# Patient Record
Sex: MALE | Race: WHITE | NOT HISPANIC OR LATINO | Employment: FULL TIME | URBAN - METROPOLITAN AREA
[De-identification: names, ages, dates, MRNs, and addresses within clinical notes are randomized per-mention and may not be internally consistent; named-entity substitution may affect disease eponyms.]

---

## 2018-09-10 ENCOUNTER — HOSPITAL ENCOUNTER (OUTPATIENT)
Dept: RADIOLOGY | Facility: HOSPITAL | Age: 48
Discharge: HOME | End: 2018-09-10
Attending: NURSE PRACTITIONER
Payer: OTHER MISCELLANEOUS

## 2018-09-10 DIAGNOSIS — S43.006A DISLOCATED SHOULDER: Primary | ICD-10-CM

## 2018-09-10 DIAGNOSIS — S43.006A DISLOCATED SHOULDER: ICD-10-CM

## 2018-09-10 PROCEDURE — 73030 X-RAY EXAM OF SHOULDER: CPT | Mod: 26,LT,, | Performed by: RADIOLOGY

## 2018-09-10 PROCEDURE — 73030 X-RAY EXAM OF SHOULDER: CPT | Mod: TC,PO,LT

## 2024-10-10 ENCOUNTER — OFFICE VISIT (OUTPATIENT)
Dept: UROLOGY | Facility: CLINIC | Age: 54
End: 2024-10-10
Payer: COMMERCIAL

## 2024-10-10 VITALS
RESPIRATION RATE: 18 BRPM | TEMPERATURE: 98 F | SYSTOLIC BLOOD PRESSURE: 130 MMHG | HEART RATE: 90 BPM | BODY MASS INDEX: 33.21 KG/M2 | DIASTOLIC BLOOD PRESSURE: 77 MMHG | WEIGHT: 237.19 LBS | HEIGHT: 71 IN

## 2024-10-10 DIAGNOSIS — N52.8 OTHER MALE ERECTILE DYSFUNCTION: ICD-10-CM

## 2024-10-10 DIAGNOSIS — E29.1 MALE HYPOGONADISM: Primary | ICD-10-CM

## 2024-10-10 DIAGNOSIS — Z12.5 PROSTATE CANCER SCREENING: ICD-10-CM

## 2024-10-10 DIAGNOSIS — R53.83 FATIGUE, UNSPECIFIED TYPE: ICD-10-CM

## 2024-10-10 PROCEDURE — 1159F MED LIST DOCD IN RCRD: CPT | Mod: CPTII,S$GLB,, | Performed by: UROLOGY

## 2024-10-10 PROCEDURE — 3078F DIAST BP <80 MM HG: CPT | Mod: CPTII,S$GLB,, | Performed by: UROLOGY

## 2024-10-10 PROCEDURE — 3008F BODY MASS INDEX DOCD: CPT | Mod: CPTII,S$GLB,, | Performed by: UROLOGY

## 2024-10-10 PROCEDURE — 99204 OFFICE O/P NEW MOD 45 MIN: CPT | Mod: S$GLB,,, | Performed by: UROLOGY

## 2024-10-10 PROCEDURE — 99999 PR PBB SHADOW E&M-NEW PATIENT-LVL V: CPT | Mod: PBBFAC,,, | Performed by: UROLOGY

## 2024-10-10 PROCEDURE — 3075F SYST BP GE 130 - 139MM HG: CPT | Mod: CPTII,S$GLB,, | Performed by: UROLOGY

## 2024-10-10 RX ORDER — TIRZEPATIDE 10 MG/.5ML
10 INJECTION, SOLUTION SUBCUTANEOUS
COMMUNITY
Start: 2024-09-10

## 2024-10-10 RX ORDER — INSULIN GLARGINE 100 [IU]/ML
INJECTION, SOLUTION SUBCUTANEOUS DAILY
COMMUNITY
Start: 2018-10-10

## 2024-10-10 RX ORDER — ASPIRIN 81 MG/1
1 TABLET ORAL EVERY MORNING
COMMUNITY
Start: 2005-10-10

## 2024-10-10 RX ORDER — HYDROCODONE BITARTRATE AND ACETAMINOPHEN 7.5; 325 MG/1; MG/1
1 TABLET ORAL 2 TIMES DAILY PRN
COMMUNITY
Start: 2024-09-24 | End: 2024-10-24

## 2024-10-10 RX ORDER — INSULIN LISPRO 100 [IU]/ML
INJECTION, SOLUTION INTRAVENOUS; SUBCUTANEOUS
COMMUNITY
Start: 2018-10-10

## 2024-10-10 RX ORDER — ZOLPIDEM TARTRATE 10 MG/1
1 TABLET ORAL NIGHTLY PRN
COMMUNITY
Start: 2024-07-11

## 2024-10-10 RX ORDER — DULOXETIN HYDROCHLORIDE 60 MG/1
60 CAPSULE, DELAYED RELEASE ORAL DAILY
COMMUNITY

## 2024-10-10 RX ORDER — FERROUS SULFATE 324(65)MG
324 TABLET, DELAYED RELEASE (ENTERIC COATED) ORAL DAILY
COMMUNITY

## 2024-10-10 RX ORDER — EMPAGLIFLOZIN 25 MG/1
25 TABLET, FILM COATED ORAL DAILY
COMMUNITY

## 2024-10-10 RX ORDER — ATORVASTATIN CALCIUM 20 MG/1
20 TABLET, FILM COATED ORAL DAILY
COMMUNITY

## 2024-10-10 RX ORDER — DOXYCYCLINE 100 MG/1
CAPSULE ORAL 2 TIMES DAILY
COMMUNITY
Start: 2024-09-12

## 2024-10-10 RX ORDER — LOSARTAN POTASSIUM AND HYDROCHLOROTHIAZIDE 12.5; 1 MG/1; MG/1
0.5 TABLET ORAL DAILY
COMMUNITY
Start: 2024-09-03

## 2024-10-10 RX ORDER — BUPROPION HYDROCHLORIDE 300 MG/1
300 TABLET ORAL DAILY
COMMUNITY

## 2024-10-10 RX ORDER — DEXTROSE 4 G
TABLET,CHEWABLE ORAL
COMMUNITY

## 2024-10-10 RX ORDER — ZINC GLUCONATE 50 MG
1 TABLET ORAL DAILY
COMMUNITY

## 2024-10-10 RX ORDER — BLOOD-GLUCOSE SENSOR
EACH MISCELLANEOUS
COMMUNITY

## 2024-10-10 RX ORDER — PENTOXIFYLLINE 400 MG/1
1 TABLET, EXTENDED RELEASE ORAL 3 TIMES DAILY
COMMUNITY
Start: 2023-11-30

## 2024-10-10 RX ORDER — UPADACITINIB 15 MG/1
15 TABLET, EXTENDED RELEASE ORAL DAILY
COMMUNITY
Start: 2022-10-10

## 2024-10-10 RX ORDER — METFORMIN HYDROCHLORIDE 500 MG/1
1000 TABLET, EXTENDED RELEASE ORAL 2 TIMES DAILY WITH MEALS
COMMUNITY
Start: 2005-10-10

## 2024-10-10 RX ORDER — GLUCAGON 3 MG/1
1 POWDER NASAL DAILY PRN
COMMUNITY
Start: 2024-03-11

## 2024-10-10 NOTE — PROGRESS NOTES
Subjective:       Patient ID: Domo Carpenter is a 54 y.o. male.    Chief Complaint: Low Testosterone      History of Present Illness:     Mr Carpenter was referred to me due to his low testosterone level of 193 on 9-3-24.  He has never been on TRT.  He is having problems with fatigue.  He has decreased libido.  He says he has had 55 pounds of intentional weight loss this year.  He has ED.  He says he has not tried an ED medication before.  No LUTS.         Past Medical History:   Diagnosis Date    Pyoderma gangrenosum     Type 2 diabetes mellitus with unspecified diabetic retinopathy without macular edema      No family history on file.  Social History     Socioeconomic History    Marital status:    Tobacco Use    Smoking status: Former     Types: Cigarettes    Smokeless tobacco: Never     Social Drivers of Health     Financial Resource Strain: Medium Risk (10/10/2024)    Overall Financial Resource Strain (CARDIA)     Difficulty of Paying Living Expenses: Somewhat hard   Food Insecurity: No Food Insecurity (10/10/2024)    Hunger Vital Sign     Worried About Running Out of Food in the Last Year: Never true     Ran Out of Food in the Last Year: Never true   Physical Activity: Sufficiently Active (10/10/2024)    Exercise Vital Sign     Days of Exercise per Week: 5 days     Minutes of Exercise per Session: 60 min   Stress: No Stress Concern Present (10/10/2024)    [employer] - Occupational Stress Questionnaire     Feeling of Stress : Not at all   Housing Stability: Unknown (10/10/2024)    Housing Stability Vital Sign     Unable to Pay for Housing in the Last Year: No     Outpatient Encounter Medications as of 10/10/2024   Medication Sig Dispense Refill    aspirin (ECOTRIN) 81 MG EC tablet Take 1 tablet by mouth every morning.      atorvastatin (LIPITOR) 20 MG tablet Take 20 mg by mouth once daily.      blood-glucose meter Misc by Other route.      buPROPion (WELLBUTRIN XL) 300 MG  24 hr tablet Take 300 mg by mouth once daily.      DEXCOM G7 SENSOR Janki USE 1 SENSOR EVERY 10 DAYS      doxycycline (MONODOX) 100 MG capsule Take by mouth 2 (two) times daily.      DULoxetine (CYMBALTA) 60 MG capsule Take 60 mg by mouth once daily.      ferrous sulfate 324 mg (65 mg iron) TbEC Take 324 mg by mouth once daily.      glucagon (BAQSIMI) 3 mg/actuation Spry 1 spray by Nasal route daily as needed.      HYDROcodone-acetaminophen (NORCO) 7.5-325 mg per tablet Take 1 tablet by mouth 2 (two) times daily as needed.      insulin lispro (HUMALOG KWIKPEN INSULIN) 100 unit/mL pen       JARDIANCE 25 mg tablet Take 25 mg by mouth once daily.      LANTUS SOLOSTAR U-100 INSULIN 100 unit/mL (3 mL) InPn pen Inject into the skin once daily. 20cc daily      losartan-hydrochlorothiazide 100-12.5 mg (HYZAAR) 100-12.5 mg Tab Take 0.5 tablets by mouth once daily.      metFORMIN (GLUCOPHAGE-XR) 500 MG ER 24hr tablet Take 1,000 mg by mouth 2 (two) times daily with meals.      MOUNJARO 10 mg/0.5 mL PnIj Inject 10 mg into the skin every 7 days.      pentoxifylline (TRENTAL) 400 mg TbSR Take 1 tablet by mouth 3 (three) times daily.      RINVOQ 15 mg 24 hr tablet Take 15 mg by mouth once daily.      zinc gluconate 50 mg tablet Take 1 tablet by mouth once daily.      zolpidem (AMBIEN) 10 mg Tab Take 1 tablet by mouth nightly as needed.       No facility-administered encounter medications on file as of 10/10/2024.        Review of Systems   Constitutional:  Negative for chills and fever.   Respiratory:  Negative for shortness of breath.    Cardiovascular:  Negative for chest pain.   Gastrointestinal:  Negative for nausea and vomiting.   Genitourinary:  Negative for difficulty urinating and hematuria.   Musculoskeletal:  Negative for back pain.   Skin:  Negative for rash.   Neurological:  Negative for dizziness.   Psychiatric/Behavioral:  Negative for agitation.        Objective:     /77   Pulse 90   Temp 98.1 °F (36.7 °C)  "(Oral)   Resp 18   Ht 5' 11" (1.803 m)   Wt 107.6 kg (237 lb 3.4 oz)   BMI 33.08 kg/m²     Physical Exam  Constitutional:       Appearance: Normal appearance. He is obese.   Pulmonary:      Effort: Pulmonary effort is normal.   Abdominal:      Palpations: Abdomen is soft.   Neurological:      Mental Status: He is alert and oriented to person, place, and time.   Psychiatric:         Mood and Affect: Mood normal.         No visits with results within 1 Day(s) from this visit.   Latest known visit with results is:   No results found for any previous visit.        No results found for this or any previous visit (from the past 8760 hours).     Assessment:       1. Male hypogonadism    2. Fatigue, unspecified type    3. Other male erectile dysfunction    4. Prostate cancer screening      Plan:     Orders Placed This Encounter    TESTOSTERONE    Testosterone, free    ESTROGENS, TOTAL    PSA, Screening        5-25-15  CT abdomen/pelvis with and without IV contrast.  OLOL.  See report.  there are multiple bilateral nonobstructing renal stones measuring between 2-3 mm, 2 on the right and one on the left.  Kidneys and adrenal glands are normal.     12-9-16  Renal ultrasound.  See report.   There is a 5 mm hyperechoic area in the upper   pole of the right kidney likely reflecting a nonobstructing stone.  No solid   renal masses.  No hydronephrosis.     I reviewed the above imaging results.         9-3-24  Cr 1.48.  GFR 57.  BUN 41.    11-29-22  HgbA1c 8.6    9-3-24  Total testosterone 193        Assessment:  - Hypogonadism.  He has never been on TRT.  He does not want to have anymore children.  - Fatigue.  - ED.  He says he has not tried an ED medication before.  - Obesity.  He says he has had 55 pounds of intentional weight loss this year.  - DM.  He is on jardiance, mournjaro, and insulin.    Plan:  - Total and free testosterone, total estrogens, and PSA tomorrow morning.  Further recommendations will be based on his lab " results.  - I had a long discussion with the patient today about his management options regarding his hypogonadism.  I discussed the option of starting on testosterone replacement therapy.  I discussed various treatment options such as injections and topical treatment.   I discussed the risks and benefits with him today regarding testosterone replacement therapy.  I discussed the possible risk of dependence to being on testosterone replacement therapy once he starts on testosterone replacement therapy.  I discussed the risk of infertility.  I discussed the risk of secondary polycythemia which may require frequent blood donations.  I discussed the risk of it increasing his liver enzymes.  I answered all of his questions to his apparent understanding and to his apparent satisfaction. The patient says that if his repeat testosterone is low that he would like to start on testosterone injections.  - RTC in January 2025.

## 2024-10-11 ENCOUNTER — LAB VISIT (OUTPATIENT)
Dept: LAB | Facility: HOSPITAL | Age: 54
End: 2024-10-11
Attending: UROLOGY
Payer: COMMERCIAL

## 2024-10-11 DIAGNOSIS — E29.1 MALE HYPOGONADISM: ICD-10-CM

## 2024-10-11 DIAGNOSIS — Z12.5 PROSTATE CANCER SCREENING: ICD-10-CM

## 2024-10-11 LAB
COMPLEXED PSA SERPL-MCNC: 0.15 NG/ML (ref 0–4)
TESTOST SERPL-MCNC: 388 NG/DL (ref 304–1227)

## 2024-10-11 PROCEDURE — 84153 ASSAY OF PSA TOTAL: CPT | Performed by: UROLOGY

## 2024-10-11 PROCEDURE — 84403 ASSAY OF TOTAL TESTOSTERONE: CPT | Performed by: UROLOGY

## 2024-10-11 PROCEDURE — 84402 ASSAY OF FREE TESTOSTERONE: CPT | Performed by: UROLOGY

## 2024-10-11 PROCEDURE — 36415 COLL VENOUS BLD VENIPUNCTURE: CPT | Mod: PN | Performed by: UROLOGY

## 2024-10-11 PROCEDURE — 82671 ASSAY OF ESTROGENS: CPT | Performed by: UROLOGY

## 2024-10-14 LAB — TESTOST FREE SERPL-MCNC: 4.2 PG/ML

## 2024-10-15 ENCOUNTER — PATIENT MESSAGE (OUTPATIENT)
Dept: UROLOGY | Facility: CLINIC | Age: 54
End: 2024-10-15
Payer: COMMERCIAL

## 2024-10-16 ENCOUNTER — TELEPHONE (OUTPATIENT)
Dept: UROLOGY | Facility: CLINIC | Age: 54
End: 2024-10-16
Payer: COMMERCIAL

## 2024-10-16 ENCOUNTER — PATIENT MESSAGE (OUTPATIENT)
Dept: UROLOGY | Facility: CLINIC | Age: 54
End: 2024-10-16
Payer: COMMERCIAL

## 2024-10-16 LAB
ESTRADIOL SERPL HS-MCNC: 17 PG/ML (ref 10–42)
ESTROGEN SERPL CALC-MCNC: 33 PG/ML (ref 19–69)
ESTRONE SERPL-MCNC: 16 PG/ML (ref 9–36)

## 2024-10-17 ENCOUNTER — TELEPHONE (OUTPATIENT)
Dept: UROLOGY | Facility: CLINIC | Age: 54
End: 2024-10-17
Payer: COMMERCIAL

## 2024-10-17 DIAGNOSIS — E29.1 MALE HYPOGONADISM: Primary | ICD-10-CM

## 2024-10-17 RX ORDER — TESTOSTERONE CYPIONATE 200 MG/ML
200 INJECTION, SOLUTION INTRAMUSCULAR
Qty: 10 ML | Refills: 3 | Status: SHIPPED | OUTPATIENT
Start: 2024-10-17 | End: 2025-04-17

## 2024-10-17 RX ORDER — TADALAFIL 20 MG/1
20 TABLET ORAL SEE ADMIN INSTRUCTIONS
Qty: 10 TABLET | Refills: 6 | Status: SHIPPED | OUTPATIENT
Start: 2024-10-17 | End: 2025-10-17

## 2024-10-17 NOTE — TELEPHONE ENCOUNTER
.Outgoing call placed to patient, patient verified name and date of birth, patient notified of below per Dr. Brandt, he verbalized understanding and notified of recommended lab orders, lab appointment scheduled as requested.       ----- Message from Mal Brandt MD sent at 10/17/2024  8:41 AM CDT -----  Please call Mr Carpenter and let him know that I prescribed him testosterone cypionate 1 ml (which is 200 mg) injection into the muscle every 2 weeks.  I placed future lab orders that I would like for him to do prior to his next appointment in the morning hours approximately 7 days after a testosterone injection.  Ask him if he has any questions.  ----- Message -----  From: Franklyn Lezama RN  Sent: 10/17/2024   8:02 AM CDT  To: Mal Brandt MD    Spoke with this patient this morning and he is interested in starting the testosterone injections. He stated that he or his wife can administer the injections on at if you order the medication since is his diabetic and already does his own injections.  ----- Message -----  From: Mal Brandt MD  Sent: 10/16/2024   6:28 PM CDT  To: Franklyn Lezama RN    Ok thank you.  If he does not call back or respond by next week then please follow back up with him to see if he would like to start on testosterone injections.  Thank you!  ----- Message -----  From: Franklyn Lezama RN  Sent: 10/16/2024   4:51 PM CDT  To: Mal Brandt MD    Hi Dr. Brandt,     I attempted to contact this patient the other day via phone but he did not answer, so I left him a voice message. I also sent him a MyChart message via the portal that shows he did read my message but did not provide a response yet. I also see that Nanci also sent him a MyChart message as well.  ----- Message -----  From: Mal Brandt MD  Sent: 10/16/2024   4:38 PM CDT  To: Franklyn Lezama RN; Scottie Bishop MA; #    Please call Mr Carpenter and let him know that I reviewed his free testosterone level and it is low  at 4.2 (less than 5.1 is considered low).  Ask him if he is still interested in starting on testosterone injections and please let me know what he says.  Let him know that his estrogen level is 33 which is normal.  Ask him if he has any questions.

## 2024-10-17 NOTE — TELEPHONE ENCOUNTER
.Outgoing call placed to patient, patient verified name and date of birth, notified patient of below, he verbalized understanding and stated he would like to start testosterone injections and he or his wife can administer them at home since he already does his diabetic injections. Message sent to MD.      ----- Message from Mal Brandt MD sent at 10/16/2024  6:27 PM CDT -----  Ok thank you.  If he does not call back or respond by next week then please follow back up with him to see if he would like to start on testosterone injections.  Thank you!  ----- Message -----  From: Franklyn Lezama RN  Sent: 10/16/2024   4:51 PM CDT  To: Mal Brandt MD    Hi Dr. Brandt,     I attempted to contact this patient the other day via phone but he did not answer, so I left him a voice message. I also sent him a [employer] message via the portal that shows he did read my message but did not provide a response yet. I also see that Nanci also sent him a [employer] message as well.  ----- Message -----  From: Mal Brandt MD  Sent: 10/16/2024   4:38 PM CDT  To: Franklyn Lezama RN; Scottie Bishop MA; #    Please call Mr Carpenter and let him know that I reviewed his free testosterone level and it is low at 4.2 (less than 5.1 is considered low).  Ask him if he is still interested in starting on testosterone injections and please let me know what he says.  Let him know that his estrogen level is 33 which is normal.  Ask him if he has any questions.

## 2024-10-17 NOTE — TELEPHONE ENCOUNTER
I spoke to pt and provided testosterone results; pt is interested in starting testosterone injections; will notify MD and get pt scheduled.  Nanci Tsai LPN    ----- Message from Mal Brandt MD sent at 10/16/2024  4:38 PM CDT -----  Please call Mr Carpenter and let him know that I reviewed his free testosterone level and it is low at 4.2 (less than 5.1 is considered low).  Ask him if he is still interested in starting on testosterone injections and please let me know what he says.  Let him know that his estrogen level is 33 which is normal.  Ask him if he has any questions.

## 2024-11-01 ENCOUNTER — TELEPHONE (OUTPATIENT)
Dept: UROLOGY | Facility: CLINIC | Age: 54
End: 2024-11-01
Payer: COMMERCIAL

## 2025-01-24 ENCOUNTER — LAB VISIT (OUTPATIENT)
Dept: LAB | Facility: HOSPITAL | Age: 55
End: 2025-01-24
Attending: UROLOGY
Payer: COMMERCIAL

## 2025-01-24 DIAGNOSIS — E29.1 MALE HYPOGONADISM: ICD-10-CM

## 2025-01-24 LAB
ALBUMIN SERPL BCP-MCNC: 3.6 G/DL (ref 3.5–5.2)
ALP SERPL-CCNC: 116 U/L (ref 40–150)
ALT SERPL W/O P-5'-P-CCNC: 21 U/L (ref 10–44)
ANION GAP SERPL CALC-SCNC: 10 MMOL/L (ref 8–16)
AST SERPL-CCNC: 32 U/L (ref 10–40)
BILIRUB SERPL-MCNC: 0.3 MG/DL (ref 0.1–1)
BUN SERPL-MCNC: 20 MG/DL (ref 6–20)
CALCIUM SERPL-MCNC: 8.5 MG/DL (ref 8.7–10.5)
CHLORIDE SERPL-SCNC: 106 MMOL/L (ref 95–110)
CO2 SERPL-SCNC: 22 MMOL/L (ref 23–29)
CREAT SERPL-MCNC: 1.4 MG/DL (ref 0.5–1.4)
EST. GFR  (NO RACE VARIABLE): >60 ML/MIN/1.73 M^2
GLUCOSE SERPL-MCNC: 134 MG/DL (ref 70–110)
HCT VFR BLD AUTO: 40.5 % (ref 40–54)
HGB BLD-MCNC: 12.6 G/DL (ref 14–18)
POTASSIUM SERPL-SCNC: 4.2 MMOL/L (ref 3.5–5.1)
PROT SERPL-MCNC: 7.7 G/DL (ref 6–8.4)
SODIUM SERPL-SCNC: 138 MMOL/L (ref 136–145)
TESTOST SERPL-MCNC: 507 NG/DL (ref 304–1227)

## 2025-01-24 PROCEDURE — 85014 HEMATOCRIT: CPT | Performed by: UROLOGY

## 2025-01-24 PROCEDURE — 82671 ASSAY OF ESTROGENS: CPT | Performed by: UROLOGY

## 2025-01-24 PROCEDURE — 80053 COMPREHEN METABOLIC PANEL: CPT | Performed by: UROLOGY

## 2025-01-24 PROCEDURE — 85018 HEMOGLOBIN: CPT | Performed by: UROLOGY

## 2025-01-24 PROCEDURE — 84403 ASSAY OF TOTAL TESTOSTERONE: CPT | Performed by: UROLOGY

## 2025-01-28 ENCOUNTER — PATIENT MESSAGE (OUTPATIENT)
Dept: UROLOGY | Facility: CLINIC | Age: 55
End: 2025-01-28
Payer: COMMERCIAL

## 2025-01-28 LAB
ESTRADIOL SERPL HS-MCNC: 23 PG/ML (ref 10–42)
ESTROGEN SERPL CALC-MCNC: 54 PG/ML (ref 19–69)
ESTRONE SERPL-MCNC: 31 PG/ML (ref 9–36)

## 2025-01-29 ENCOUNTER — TELEPHONE (OUTPATIENT)
Dept: UROLOGY | Facility: CLINIC | Age: 55
End: 2025-01-29
Payer: COMMERCIAL

## 2025-01-29 NOTE — TELEPHONE ENCOUNTER
Spoke with patient who was able to provide acceptable patient identifiers prior to start of conversation. Patient notified about lab results and scheduled for appointment on 03/20/2025 3:45PM [facility]. Patient verbalized understanding no further assistance needed.

## 2025-01-29 NOTE — TELEPHONE ENCOUNTER
----- Message from Mal Brandt MD sent at 1/29/2025  7:23 AM CST -----  Please call Mr Jayden and let him know that I reviewed his labs and his testosterone is 507 which is normal, his estrogen is 54 which is normal, His hemoglobin (which is a measurement of his blood thickness) is mildly low, his creatinine (which is a measurement of his overall kidney function) is 1.4 which is at the upper end of normal.  He does not have a follow up appointment to see me.  He can follow up with me at his earliest convenience.  Ask him if he has any questions.

## 2025-05-13 DIAGNOSIS — E29.1 MALE HYPOGONADISM: ICD-10-CM

## 2025-05-13 RX ORDER — TESTOSTERONE CYPIONATE 200 MG/ML
200 INJECTION, SOLUTION INTRAMUSCULAR
Qty: 10 ML | Refills: 0 | Status: SHIPPED | OUTPATIENT
Start: 2025-05-13 | End: 2025-11-11

## 2025-05-13 NOTE — TELEPHONE ENCOUNTER
MA received medication refill for  testosterone cypionate (DEPOTESTOTERONE CYPIONATE) 200 mg/mL injection. Last office visit was 10/10/24 with instructions to Inject 1 mL (200 mg total) into the muscle every 14 (fourteen) days. Follow up kailey scheduled for 5/29/25.Last prescription for testosterone cypionate (DEPOTESTOTERONE CYPIONATE) 200 mg/mL injection was sent to the pharmacy on 10/17/24 with 3 refills. Prescription routed to provider.    Please see the attached refill request.

## 2025-06-11 RX ORDER — TADALAFIL 20 MG/1
20 TABLET ORAL SEE ADMIN INSTRUCTIONS
Qty: 10 TABLET | Refills: 1 | Status: SHIPPED | OUTPATIENT
Start: 2025-06-11 | End: 2026-06-11

## 2025-08-05 ENCOUNTER — TELEPHONE (OUTPATIENT)
Dept: UROLOGY | Facility: CLINIC | Age: 55
End: 2025-08-05
Payer: COMMERCIAL

## 2025-08-05 ENCOUNTER — PATIENT MESSAGE (OUTPATIENT)
Dept: UROLOGY | Facility: CLINIC | Age: 55
End: 2025-08-05
Payer: COMMERCIAL

## 2025-08-05 NOTE — TELEPHONE ENCOUNTER
Called patient and got him scheduled per his request. Confirmed appointment time, date and location. No further assistance needed at this time.

## 2025-08-07 ENCOUNTER — OFFICE VISIT (OUTPATIENT)
Dept: UROLOGY | Facility: CLINIC | Age: 55
End: 2025-08-07
Payer: COMMERCIAL

## 2025-08-07 VITALS
WEIGHT: 211.44 LBS | DIASTOLIC BLOOD PRESSURE: 78 MMHG | SYSTOLIC BLOOD PRESSURE: 115 MMHG | BODY MASS INDEX: 29.6 KG/M2 | RESPIRATION RATE: 18 BRPM | HEART RATE: 120 BPM | HEIGHT: 71 IN

## 2025-08-07 DIAGNOSIS — E29.1 MALE HYPOGONADISM: Primary | ICD-10-CM

## 2025-08-07 DIAGNOSIS — E66.89 OTHER OBESITY NOT ELSEWHERE CLASSIFIED: ICD-10-CM

## 2025-08-07 DIAGNOSIS — E11.69 ERECTILE DYSFUNCTION ASSOCIATED WITH TYPE 2 DIABETES MELLITUS: ICD-10-CM

## 2025-08-07 DIAGNOSIS — N52.1 ERECTILE DYSFUNCTION ASSOCIATED WITH TYPE 2 DIABETES MELLITUS: ICD-10-CM

## 2025-08-07 DIAGNOSIS — Z12.5 PROSTATE CANCER SCREENING: ICD-10-CM

## 2025-08-07 PROCEDURE — 4010F ACE/ARB THERAPY RXD/TAKEN: CPT | Mod: CPTII,S$GLB,, | Performed by: UROLOGY

## 2025-08-07 PROCEDURE — 1159F MED LIST DOCD IN RCRD: CPT | Mod: CPTII,S$GLB,, | Performed by: UROLOGY

## 2025-08-07 PROCEDURE — 99999 PR PBB SHADOW E&M-EST. PATIENT-LVL V: CPT | Mod: PBBFAC,,, | Performed by: UROLOGY

## 2025-08-07 PROCEDURE — 3008F BODY MASS INDEX DOCD: CPT | Mod: CPTII,S$GLB,, | Performed by: UROLOGY

## 2025-08-07 PROCEDURE — 3074F SYST BP LT 130 MM HG: CPT | Mod: CPTII,S$GLB,, | Performed by: UROLOGY

## 2025-08-07 PROCEDURE — 3078F DIAST BP <80 MM HG: CPT | Mod: CPTII,S$GLB,, | Performed by: UROLOGY

## 2025-08-07 PROCEDURE — 99215 OFFICE O/P EST HI 40 MIN: CPT | Mod: S$GLB,,, | Performed by: UROLOGY

## 2025-08-07 RX ORDER — ERGOCALCIFEROL 1.25 MG/1
50000 CAPSULE ORAL
COMMUNITY

## 2025-08-07 RX ORDER — LOSARTAN POTASSIUM 50 MG/1
50 TABLET ORAL DAILY
COMMUNITY
Start: 2025-02-11 | End: 2026-02-11

## 2025-08-07 RX ORDER — TADALAFIL 20 MG/1
20 TABLET ORAL SEE ADMIN INSTRUCTIONS
Qty: 30 TABLET | Refills: 3 | Status: SHIPPED | OUTPATIENT
Start: 2025-08-07 | End: 2026-08-07

## 2025-08-07 RX ORDER — ONDANSETRON 8 MG/1
8 TABLET, ORALLY DISINTEGRATING ORAL EVERY 8 HOURS PRN
COMMUNITY

## 2025-08-07 RX ORDER — HYDROCODONE BITARTRATE AND ACETAMINOPHEN 7.5; 325 MG/1; MG/1
1 TABLET ORAL 2 TIMES DAILY PRN
COMMUNITY

## 2025-08-07 NOTE — PROGRESS NOTES
"Subjective:       Patient ID: Domo Carpenter is a 55 y.o. male.    Chief Complaint: Hypogonadism      History of Present Illness:     Mr Carpenter has hypogonadism.  He was started on testosterone 200 mg IM every 2 weeks (every other Sunday) in October 2024.  He is no longer having problems with fatigue.  His energy level has improved with the testosterone injections.  He does not want to have anymore children.  He says that he has 2 kids.  He has ED and tadalafil 20 mg is working for his erections.  He says he has lost approximately 90 pounds of intentional weight loss this year on mounjaro.         Past Medical History:   Diagnosis Date    Pyoderma gangrenosum     Type 2 diabetes mellitus with unspecified diabetic retinopathy without macular edema      No family history on file.  Social History[1]  Encounter Medications[2]     Review of Systems   Constitutional:  Negative for chills and fever.   Respiratory:  Negative for shortness of breath.    Cardiovascular:  Negative for chest pain.   Gastrointestinal:  Negative for nausea and vomiting.   Genitourinary:  Negative for difficulty urinating.   Musculoskeletal:  Negative for back pain.   Skin:  Negative for rash.   Neurological:  Negative for dizziness.   Psychiatric/Behavioral:  Negative for agitation.        Objective:     /78 (BP Location: Right arm, Patient Position: Sitting)   Pulse (!) 120   Resp 18   Ht 5' 11" (1.803 m)   Wt 95.9 kg (211 lb 6.7 oz)   BMI 29.49 kg/m²     Physical Exam  Constitutional:       Appearance: Normal appearance. He is obese.   Pulmonary:      Effort: Pulmonary effort is normal.   Abdominal:      Palpations: Abdomen is soft.   Neurological:      Mental Status: He is alert and oriented to person, place, and time.   Psychiatric:         Mood and Affect: Mood normal.         No visits with results within 1 Day(s) from this visit.   Latest known visit with results is:   Lab Visit on 01/24/2025   Component Date Value Ref " Range Status    Sodium 01/24/2025 138  136 - 145 mmol/L Final    Potassium 01/24/2025 4.2  3.5 - 5.1 mmol/L Final    Chloride 01/24/2025 106  95 - 110 mmol/L Final    CO2 01/24/2025 22 (L)  23 - 29 mmol/L Final    Glucose 01/24/2025 134 (H)  70 - 110 mg/dL Final    BUN 01/24/2025 20  6 - 20 mg/dL Final    Creatinine 01/24/2025 1.4  0.5 - 1.4 mg/dL Final    Calcium 01/24/2025 8.5 (L)  8.7 - 10.5 mg/dL Final    Total Protein 01/24/2025 7.7  6.0 - 8.4 g/dL Final    Albumin 01/24/2025 3.6  3.5 - 5.2 g/dL Final    Total Bilirubin 01/24/2025 0.3  0.1 - 1.0 mg/dL Final    Comment: For infants and newborns, interpretation of results should be based  on gestational age, weight and in agreement with clinical  observations.    Premature Infant recommended reference ranges:  Up to 24 hours.............<8.0 mg/dL  Up to 48 hours............<12.0 mg/dL  3-5 days..................<15.0 mg/dL  6-29 days.................<15.0 mg/dL      Alkaline Phosphatase 01/24/2025 116  40 - 150 U/L Final    AST 01/24/2025 32  10 - 40 U/L Final    ALT 01/24/2025 21  10 - 44 U/L Final    eGFR 01/24/2025 >60.0  >60 mL/min/1.73 m^2 Final    Anion Gap 01/24/2025 10  8 - 16 mmol/L Final    Testosterone, Total 01/24/2025 507  304 - 1227 ng/dL Final    Hemoglobin 01/24/2025 12.6 (L)  14.0 - 18.0 g/dL Final    Hematocrit 01/24/2025 40.5  40.0 - 54.0 % Final    Estrone (Esoterix) 01/24/2025 31  9 - 36 pg/mL Final    Estradiol 01/24/2025 23  10 - 42 pg/mL Final    Estrogen 01/24/2025 54  19 - 69 pg/mL Final    Comment: This test was developed and its performance characteristics   determined by [facility] in a manner consistent  with CLIA requirements. This test has not been cleared or   approved by the U.S. Food and Drug Administration. This test  is used for patient testing purposes. It should not be   regarded as investigational or for research.    Test performed at [facility],  [address], [place]  [address]      [phone]  Christine Pruitt MD, PhD - Medical Director          No results found for this or any previous visit (from the past 8760 hours).     Assessment:       1. Male hypogonadism    2. Other obesity not elsewhere classified    3. Erectile dysfunction associated with type 2 diabetes mellitus    4. Prostate cancer screening        Plan:     Orders Placed This Encounter    Comprehensive Metabolic Panel    Testosterone,Total    Hemoglobin    Hematocrit    Estrogens, Total    PSA, Screening    tadalafiL (CIALIS) 20 MG Tab          5-25-15  CT abdomen/pelvis with and without IV contrast.  OLOL.  See report.  there are multiple bilateral nonobstructing renal stones measuring between 2-3 mm, 2 on the right and one on the left.  Kidneys and adrenal glands are normal.      12-9-16  Renal ultrasound.  See report.   There is a 5 mm hyperechoic area in the upper   pole of the right kidney likely reflecting a nonobstructing stone.  No solid   renal masses.  No hydronephrosis.      I reviewed the above imaging results.           10-11-24  PSA 0.15     10-11-24  Total testosterone 388.  Free testosterone 4.2.     9-3-24  Total testosterone 193    10-11-24  Total estrogens 33    1-24-25  H/H 12.6/40.5    2-11-25  Cr 1.36.  GFR 62.  BUN 25.    9-3-24  Cr 1.48.  GFR 57.  BUN 41.    4-1-25  HgbA1c 5.9     11-29-22  HgbA1c 8.6     I reviewed the above lab results.           Assessment:  - Hypogonadism.  He was started on testosterone 200 mg IM every 2 weeks (every other Sunday) in October 2024.  He is no longer having problems with fatigue.  He does not want to have anymore children.  He says that he has 2 kids.  - ED.  Tadalafil 20 mg is working for his erections.  - Obesity.  He says he has lost approximately 90 pounds of intentional weight loss this year on mounjaro.  - DM.  He is on jardiance, mounjaro, and insulin.     Plan:  - Total testosterone, total estrogens, CMP, H/H, and PSA on Monday 8-11-25 in the morning hours.   He says his most recent testosterone 200 mg IM injection was on Won 8-3-24.   - I had a long discussion with the patient today about his management options regarding his hypogonadism.  I discussed the option of him continuing on the testosterone injections.  I discussed the option of him stopping the testosterone injections and changing to clomiphene.  I discussed the risks and benefits with him today regarding testosterone replacement therapy.  I discussed the possible risk of dependence to being on testosterone replacement therapy the longer that he is on testosterone replacement therapy.  I discussed the risk of infertility.  I discussed the risk of secondary polycythemia which may require frequent blood donations.  I discussed the risk of it increasing his liver enzymes.  I answered all of his questions to his apparent understanding and to his apparent satisfaction. The patient says that he would like to wait until after his lab results return before making a decision.  - Refilled tadalafil 20 mg, disp #30, 3 refills to [facility] in [place] today on 8-7-25.  He was give a [employer] coupon card today on 8-7-25.  - I encouraged a healthy lifestyle and that he continue to keep the weight off.  - I encouraged good diabetes control and I explained how diabetes can negatively effect erections.  I recommended he follow the advice of his provider that is managing his diabetes.   - RTC in 6 months or sooner as needed.                            [1]   Social History  Socioeconomic History    Marital status:    Tobacco Use    Smoking status: Former     Types: Cigarettes    Smokeless tobacco: Never     Social Drivers of Health     Financial Resource Strain: Medium Risk (8/7/2025)    Overall Financial Resource Strain (CARDIA)     Difficulty of Paying Living Expenses: Somewhat hard   Food Insecurity: No Food Insecurity (8/7/2025)    Hunger Vital Sign     Worried About Running Out of Food in the Last Year: Never true      Ran Out of Food in the Last Year: Never true   Transportation Needs: No Transportation Needs (8/7/2025)    PRAPARE - Transportation     Lack of Transportation (Medical): No     Lack of Transportation (Non-Medical): No   Physical Activity: Sufficiently Active (8/7/2025)    Exercise Vital Sign     Days of Exercise per Week: 5 days     Minutes of Exercise per Session: 150+ min   Stress: No Stress Concern Present (8/7/2025)    [employer] - Occupational Stress Questionnaire     Feeling of Stress : Not at all   Housing Stability: Low Risk  (8/7/2025)    Housing Stability Vital Sign     Unable to Pay for Housing in the Last Year: No     Number of Times Moved in the Last Year: 0     Homeless in the Last Year: No   [2]   Outpatient Encounter Medications as of 8/7/2025   Medication Sig Dispense Refill    aspirin (ECOTRIN) 81 MG EC tablet Take 1 tablet by mouth every morning.      atorvastatin (LIPITOR) 20 MG tablet Take 20 mg by mouth once daily.      blood-glucose meter Misc by Other route.      buPROPion (WELLBUTRIN XL) 300 MG 24 hr tablet Take 300 mg by mouth once daily.      DEXCOM G7 SENSOR Janki USE 1 SENSOR EVERY 10 DAYS      doxycycline (MONODOX) 100 MG capsule Take by mouth 2 (two) times daily.      DULoxetine (CYMBALTA) 60 MG capsule Take 60 mg by mouth once daily.      ferrous sulfate 324 mg (65 mg iron) TbEC Take 324 mg by mouth once daily.      glucagon (BAQSIMI) 3 mg/actuation Spry 1 spray by Nasal route daily as needed.      HYDROcodone-acetaminophen (NORCO) 7.5-325 mg per tablet Take 1 tablet by mouth 2 (two) times daily as needed.      insulin lispro (HUMALOG KWIKPEN INSULIN) 100 unit/mL pen       JARDIANCE 25 mg tablet Take 25 mg by mouth once daily.      LANTUS SOLOSTAR U-100 INSULIN 100 unit/mL (3 mL) InPn pen Inject into the skin once daily. 20cc daily      losartan (COZAAR) 50 MG tablet Take 50 mg by mouth once daily.      losartan-hydrochlorothiazide 100-12.5 mg (HYZAAR)  100-12.5 mg Tab Take 0.5 tablets by mouth once daily.      metFORMIN (GLUCOPHAGE-XR) 500 MG ER 24hr tablet Take 1,000 mg by mouth 2 (two) times daily with meals.      MOUNJARO 10 mg/0.5 mL PnIj Inject 10 mg into the skin every 7 days.      ondansetron (ZOFRAN-ODT) 8 MG TbDL Take 8 mg by mouth every 8 (eight) hours as needed.      pentoxifylline (TRENTAL) 400 mg TbSR Take 1 tablet by mouth 3 (three) times daily.      RINVOQ 15 mg 24 hr tablet Take 15 mg by mouth once daily.      tadalafiL (CIALIS) 20 MG Tab Take 1 tablet (20 mg total) by mouth As instructed (Take either 1/2 tablet or 1 tablet by mouth as needed 2 to 12 hours prior to sexual activity.). 10 tablet 1    testosterone cypionate (DEPOTESTOTERONE CYPIONATE) 200 mg/mL injection Inject 1 mL (200 mg total) into the muscle every 14 (fourteen) days. 10 mL 0    VITAMIN D2 1,250 mcg (50,000 unit) capsule Take 50,000 Units by mouth every 7 days.      zinc gluconate 50 mg tablet Take 1 tablet by mouth once daily.      zolpidem (AMBIEN) 10 mg Tab Take 1 tablet by mouth nightly as needed.      tadalafiL (CIALIS) 20 MG Tab Take 1 tablet (20 mg total) by mouth As instructed (Take 1 tablet by mouth as needed 2 to 12 hours prior to sexual activity.). 30 tablet 3     No facility-administered encounter medications on file as of 8/7/2025.

## 2025-08-13 ENCOUNTER — LAB VISIT (OUTPATIENT)
Dept: LAB | Facility: HOSPITAL | Age: 55
End: 2025-08-13
Attending: UROLOGY
Payer: COMMERCIAL

## 2025-08-13 DIAGNOSIS — E29.1 MALE HYPOGONADISM: ICD-10-CM

## 2025-08-13 DIAGNOSIS — Z12.5 PROSTATE CANCER SCREENING: ICD-10-CM

## 2025-08-13 LAB
ALBUMIN SERPL BCP-MCNC: 3.3 G/DL (ref 3.5–5.2)
ALP SERPL-CCNC: 112 UNIT/L (ref 40–150)
ALT SERPL W/O P-5'-P-CCNC: 11 UNIT/L (ref 0–55)
ANION GAP (OHS): 12 MMOL/L (ref 8–16)
AST SERPL-CCNC: 16 UNIT/L (ref 0–50)
BILIRUB SERPL-MCNC: 0.3 MG/DL (ref 0.1–1)
BUN SERPL-MCNC: 16 MG/DL (ref 6–20)
CALCIUM SERPL-MCNC: 9.3 MG/DL (ref 8.7–10.5)
CHLORIDE SERPL-SCNC: 100 MMOL/L (ref 95–110)
CO2 SERPL-SCNC: 24 MMOL/L (ref 23–29)
CREAT SERPL-MCNC: 1.1 MG/DL (ref 0.5–1.4)
GFR SERPLBLD CREATININE-BSD FMLA CKD-EPI: >60 ML/MIN/1.73/M2
GLUCOSE SERPL-MCNC: 98 MG/DL (ref 70–110)
HCT VFR BLD AUTO: 38.2 % (ref 40–54)
HGB BLD-MCNC: 10.7 GM/DL (ref 14–18)
POTASSIUM SERPL-SCNC: 4.5 MMOL/L (ref 3.5–5.1)
PROT SERPL-MCNC: 8.3 GM/DL (ref 6–8.4)
PSA SERPL-MCNC: 0.6 NG/ML
SODIUM SERPL-SCNC: 136 MMOL/L (ref 136–145)
TESTOST SERPL-MCNC: 739 NG/DL (ref 304–1227)

## 2025-08-13 PROCEDURE — 36415 COLL VENOUS BLD VENIPUNCTURE: CPT | Mod: PN

## 2025-08-13 PROCEDURE — 84153 ASSAY OF PSA TOTAL: CPT

## 2025-08-13 PROCEDURE — 80053 COMPREHEN METABOLIC PANEL: CPT

## 2025-08-13 PROCEDURE — 85014 HEMATOCRIT: CPT

## 2025-08-13 PROCEDURE — 82672 ASSAY OF ESTROGEN: CPT

## 2025-08-13 PROCEDURE — 84403 ASSAY OF TOTAL TESTOSTERONE: CPT

## 2025-08-13 PROCEDURE — 85018 HEMOGLOBIN: CPT

## 2025-08-15 RX ORDER — CLOMIPHENE CITRATE 50 MG/1
25 TABLET ORAL DAILY
Qty: 45 TABLET | Refills: 1 | Status: SHIPPED | OUTPATIENT
Start: 2025-08-15 | End: 2025-11-13

## 2025-08-19 LAB
ESTRADIOL: 37 PG/ML
ESTROGEN SERPL-MCNC: 67 PG/ML
ESTRONE PG/ML: 30 PG/ML

## 2025-08-21 ENCOUNTER — PATIENT MESSAGE (OUTPATIENT)
Dept: UROLOGY | Facility: CLINIC | Age: 55
End: 2025-08-21
Payer: COMMERCIAL

## 2025-08-22 ENCOUNTER — TELEPHONE (OUTPATIENT)
Dept: UROLOGY | Facility: CLINIC | Age: 55
End: 2025-08-22
Payer: COMMERCIAL

## 2025-08-26 ENCOUNTER — PATIENT MESSAGE (OUTPATIENT)
Dept: UROLOGY | Facility: CLINIC | Age: 55
End: 2025-08-26
Payer: COMMERCIAL